# Patient Record
Sex: MALE | Race: WHITE | Employment: FULL TIME | URBAN - METROPOLITAN AREA
[De-identification: names, ages, dates, MRNs, and addresses within clinical notes are randomized per-mention and may not be internally consistent; named-entity substitution may affect disease eponyms.]

---

## 2017-01-26 ENCOUNTER — HOSPITAL ENCOUNTER (OUTPATIENT)
Dept: PATHOLOGY | Age: 62
Discharge: HOME | End: 2017-01-26
Attending: PLASTIC SURGERY
Payer: COMMERCIAL

## 2017-01-26 DIAGNOSIS — R22.0 CHEEK MASS: ICD-10-CM

## 2017-01-26 PROCEDURE — 10021 FNA BX W/O IMG GDN 1ST LES: CPT | Performed by: PLASTIC SURGERY

## 2017-01-26 PROCEDURE — 88173 CYTOPATH EVAL FNA REPORT: CPT | Performed by: PLASTIC SURGERY

## 2017-03-17 ENCOUNTER — HOSPITAL ENCOUNTER (OUTPATIENT)
Dept: PREADMISSION TESTING | Age: 62
Discharge: HOME | End: 2017-03-17
Attending: PLASTIC SURGERY
Payer: COMMERCIAL

## 2017-03-17 LAB
ALBUMIN SERPL BCP-MCNC: 3.8 G/DL (ref 3.5–5)
ALBUMIN/GLOB SERPL: 1.1 {RATIO} (ref 1.1–2.2)
ALP SERPL-CCNC: 78 U/L (ref 45–117)
ALT SERPL-CCNC: 53 U/L (ref 12–78)
ANION GAP BLD CALC-SCNC: 9 MMOL/L (ref 5–15)
AST SERPL W P-5'-P-CCNC: 25 U/L (ref 15–37)
BASOPHILS # BLD AUTO: 0 K/UL (ref 0–0.1)
BASOPHILS # BLD: 1 % (ref 0–1)
BILIRUB SERPL-MCNC: 0.4 MG/DL (ref 0.2–1)
BUN SERPL-MCNC: 19 MG/DL (ref 6–20)
BUN/CREAT SERPL: 22 (ref 12–20)
CALCIUM SERPL-MCNC: 8.6 MG/DL (ref 8.5–10.1)
CHLORIDE SERPL-SCNC: 109 MMOL/L (ref 97–108)
CO2 SERPL-SCNC: 25 MMOL/L (ref 21–32)
CREAT SERPL-MCNC: 0.87 MG/DL (ref 0.7–1.3)
EOSINOPHIL # BLD: 0.2 K/UL (ref 0–0.4)
EOSINOPHIL NFR BLD: 3 % (ref 0–7)
ERYTHROCYTE [DISTWIDTH] IN BLOOD BY AUTOMATED COUNT: 13.6 % (ref 11.5–14.5)
GLOBULIN SER CALC-MCNC: 3.6 G/DL (ref 2–4)
GLUCOSE SERPL-MCNC: 93 MG/DL (ref 65–100)
HCT VFR BLD AUTO: 43.6 % (ref 36.6–50.3)
HGB BLD-MCNC: 14.6 G/DL (ref 12.1–17)
LYMPHOCYTES # BLD AUTO: 25 % (ref 12–49)
LYMPHOCYTES # BLD: 1.8 K/UL (ref 0.8–3.5)
MCH RBC QN AUTO: 29.9 PG (ref 26–34)
MCHC RBC AUTO-ENTMCNC: 33.5 G/DL (ref 30–36.5)
MCV RBC AUTO: 89.2 FL (ref 80–99)
MONOCYTES # BLD: 0.9 K/UL (ref 0–1)
MONOCYTES NFR BLD AUTO: 13 % (ref 5–13)
NEUTS SEG # BLD: 4 K/UL (ref 1.8–8)
NEUTS SEG NFR BLD AUTO: 58 % (ref 32–75)
PLATELET # BLD AUTO: 194 K/UL (ref 150–400)
POTASSIUM SERPL-SCNC: 4.1 MMOL/L (ref 3.5–5.1)
PROT SERPL-MCNC: 7.4 G/DL (ref 6.4–8.2)
RBC # BLD AUTO: 4.89 M/UL (ref 4.1–5.7)
SODIUM SERPL-SCNC: 143 MMOL/L (ref 136–145)
WBC # BLD AUTO: 7 K/UL (ref 4.1–11.1)

## 2017-03-17 PROCEDURE — 80053 COMPREHEN METABOLIC PANEL: CPT | Performed by: PLASTIC SURGERY

## 2017-03-17 PROCEDURE — 85025 COMPLETE CBC W/AUTO DIFF WBC: CPT | Performed by: PLASTIC SURGERY

## 2017-03-17 PROCEDURE — 36415 COLL VENOUS BLD VENIPUNCTURE: CPT | Performed by: PLASTIC SURGERY

## 2017-03-17 RX ORDER — AMLODIPINE BESYLATE 5 MG/1
5 TABLET ORAL DAILY
COMMUNITY

## 2017-03-17 RX ORDER — ATORVASTATIN CALCIUM 40 MG/1
40 TABLET, FILM COATED ORAL DAILY
COMMUNITY

## 2017-03-17 RX ORDER — ASPIRIN 325 MG
325 TABLET ORAL DAILY
COMMUNITY
End: 2019-05-23

## 2017-03-17 RX ORDER — LISINOPRIL 20 MG/1
20 TABLET ORAL DAILY
COMMUNITY

## 2017-03-17 RX ORDER — METOPROLOL SUCCINATE 100 MG/1
TABLET, EXTENDED RELEASE ORAL DAILY
COMMUNITY

## 2017-03-17 NOTE — PERIOP NOTES
[facility]  Preoperative Instructions        Surgery Date 3/24/2017          Time of Arrival 12:00 PM    1. On the day of your surgery, please report to the Surgical Services Registration Desk and sign in at your designated time. The Surgery Center is located to the right of the Emergency Room. 2. You must have someone with you to drive you home. You should not drive a car for 24 hours following surgery. Please make arrangements for a friend or family member to stay with you for the first 24 hours after your surgery. 3. Do not have anything to eat or drink (including water, gum, mints, coffee, juice) after midnight 3/23/2017              . This may not apply to medications prescribed by your physician. Please note special instructions, if applicable. If you are currently taking Plavix, Coumadin, or other blood-thinning agents, contact your surgeon for instructions. 4. We recommend you do not drink any alcoholic beverages for 24 hours before and after your surgery. 5. Have a list of all current medications, including vitamins, herbal supplements and any other over the counter medications. Stop all Aspirin and non-steroidal anti-inflammatory drugs (I.e. Advil, Aleve), as directed by your surgeon's office. Stop all vitamins and herbal supplements seven days prior to your surgery. 6. Wear comfortable clothes. Wear glasses instead of contacts. Do not bring any money or jewelry. Please bring picture ID, insurance card, and any prearranged co-payment or hospital payment. Do not wear make-up, particularly mascara the morning of your surgery. Do not wear nail polish, particularly if you are having foot /hand surgery. Wear your hair loose or down, no ponytails, buns, rivera pins or clips. All body piercings must be removed.   Please shower with antibacterial soap for three consecutive days before and on the morning of surgery, but do not apply any lotions, powders or deodorants after the shower on the day of surgery. Please use a fresh towels after each shower. Please sleep in clean clothes and change bed linens the night before surgery. Please do not shave for 48 hours prior to surgery. Shaving of the face is acceptable. 7. You should understand that if you do not follow these instructions your surgery may be cancelled. If your physical condition changes (I.e. fever, cold or flu) please contact your surgeon as soon as possible. 8. It is important that you be on time. If a situation occurs where you may be late, please call [phone] (OR Holding Area). 9. If you have any questions and or problems, please call [phone] (Pre-admission Testing). 10. Your surgery time may be subject to change. You will receive a phone call the evening prior if your time changes. 11.  If having outpatient surgery, you must have someone to drive you here, stay with you during the duration of your stay, and to drive you home at time of discharge. Special Instructions:Stop Aspirin according to your Surgeon/Cardiologist Instruction. Call PAT Department back today to give medication list [phone]. MEDICATIONS TO TAKE THE MORNING OF SURGERY WITH A SIP OF WATER: Metoprolol, Amlodipine      I understand a pre-operative phone call will be made to verify my surgery time. In the event that I am not available, I give permission for a message to be left on my answering service and/or with another person?  Yes [phone]         ___________________      __________   _________    (Signature of Patient)             (Witness)                (Date and Time)

## 2017-03-17 NOTE — PERIOP NOTES
Pt did not bring medications or medication list with him to his PAT visit today. He did not know the names of his medications or dosages, I requested he call back today and please provide the names and dosages of his current medications. I also instructed him to call his Surgeon/Cardiologist regarding his Aspirin therapy and when or if it should be stopped in preparation for surgery. Pt stated he will call me back today.  Radha Nolen RN

## 2017-03-17 NOTE — PERIOP NOTES
Pt called in with current medications, added to PTA list. Pt instructed to also take his Amlodipine the morning of surgery with a small sip of water along with his Metoprolol. Pt able to verbalize instructions accurately. Addendum made to pt take home Instruction Sheet.  Lysle Sandhoff RN

## 2017-03-24 ENCOUNTER — HOSPITAL ENCOUNTER (OUTPATIENT)
Age: 62
Setting detail: OUTPATIENT SURGERY
Discharge: HOME | End: 2017-03-24
Attending: PLASTIC SURGERY | Admitting: PLASTIC SURGERY
Payer: COMMERCIAL

## 2017-03-24 ENCOUNTER — ANESTHESIA (OUTPATIENT)
Dept: SURGERY | Age: 62
End: 2017-03-24
Payer: COMMERCIAL

## 2017-03-24 ENCOUNTER — ANESTHESIA EVENT (OUTPATIENT)
Dept: SURGERY | Age: 62
End: 2017-03-24
Payer: COMMERCIAL

## 2017-03-24 VITALS
OXYGEN SATURATION: 95 % | DIASTOLIC BLOOD PRESSURE: 60 MMHG | SYSTOLIC BLOOD PRESSURE: 152 MMHG | HEIGHT: 70 IN | HEART RATE: 66 BPM | BODY MASS INDEX: 36.61 KG/M2 | TEMPERATURE: 98.5 F | RESPIRATION RATE: 16 BRPM | WEIGHT: 255.73 LBS

## 2017-03-24 PROCEDURE — 76010000138 HC OR TIME 0.5 TO 1 HR: Performed by: PLASTIC SURGERY

## 2017-03-24 PROCEDURE — 77030018836 HC SOL IRR NACL ICUM -A: Performed by: PLASTIC SURGERY

## 2017-03-24 PROCEDURE — 74011250636 HC RX REV CODE- 250/636: Performed by: PLASTIC SURGERY

## 2017-03-24 PROCEDURE — 74011250636 HC RX REV CODE- 250/636

## 2017-03-24 PROCEDURE — 77030002933 HC SUT MCRYL J&J -A: Performed by: PLASTIC SURGERY

## 2017-03-24 PROCEDURE — 77030020782 HC GWN BAIR PAWS FLX 3M -B

## 2017-03-24 PROCEDURE — 77030032490 HC SLV COMPR SCD KNE COVD -B: Performed by: PLASTIC SURGERY

## 2017-03-24 PROCEDURE — 88304 TISSUE EXAM BY PATHOLOGIST: CPT | Performed by: PLASTIC SURGERY

## 2017-03-24 PROCEDURE — 76210000020 HC REC RM PH II FIRST 0.5 HR: Performed by: PLASTIC SURGERY

## 2017-03-24 PROCEDURE — 74011250636 HC RX REV CODE- 250/636: Performed by: ANESTHESIOLOGY

## 2017-03-24 PROCEDURE — 77030011640 HC PAD GRND REM COVD -A: Performed by: PLASTIC SURGERY

## 2017-03-24 PROCEDURE — 76210000006 HC OR PH I REC 0.5 TO 1 HR: Performed by: PLASTIC SURGERY

## 2017-03-24 PROCEDURE — 77030002986 HC SUT PROL J&J -A: Performed by: PLASTIC SURGERY

## 2017-03-24 PROCEDURE — 74011000250 HC RX REV CODE- 250: Performed by: PLASTIC SURGERY

## 2017-03-24 PROCEDURE — 76060000032 HC ANESTHESIA 0.5 TO 1 HR: Performed by: PLASTIC SURGERY

## 2017-03-24 RX ORDER — SODIUM CHLORIDE, SODIUM LACTATE, POTASSIUM CHLORIDE, CALCIUM CHLORIDE 600; 310; 30; 20 MG/100ML; MG/100ML; MG/100ML; MG/100ML
25 INJECTION, SOLUTION INTRAVENOUS CONTINUOUS
Status: DISCONTINUED | OUTPATIENT
Start: 2017-03-24 | End: 2017-03-24 | Stop reason: HOSPADM

## 2017-03-24 RX ORDER — CEFAZOLIN SODIUM IN 0.9 % NACL 2 G/100 ML
2 PLASTIC BAG, INJECTION (ML) INTRAVENOUS EVERY 8 HOURS
Status: COMPLETED | OUTPATIENT
Start: 2017-03-24 | End: 2017-03-24

## 2017-03-24 RX ORDER — ACETAMINOPHEN AND CODEINE PHOSPHATE 300; 30 MG/1; MG/1
1-2 TABLET ORAL
Qty: 20 TAB | Refills: 0 | Status: SHIPPED | OUTPATIENT
Start: 2017-03-24

## 2017-03-24 RX ORDER — LIDOCAINE HYDROCHLORIDE AND EPINEPHRINE 10; 10 MG/ML; UG/ML
INJECTION, SOLUTION INFILTRATION; PERINEURAL AS NEEDED
Status: DISCONTINUED | OUTPATIENT
Start: 2017-03-24 | End: 2017-03-24 | Stop reason: HOSPADM

## 2017-03-24 RX ORDER — MIDAZOLAM HYDROCHLORIDE 1 MG/ML
INJECTION, SOLUTION INTRAMUSCULAR; INTRAVENOUS AS NEEDED
Status: DISCONTINUED | OUTPATIENT
Start: 2017-03-24 | End: 2017-03-24 | Stop reason: HOSPADM

## 2017-03-24 RX ORDER — LANOLIN ALCOHOL/MO/W.PET/CERES
1000 CREAM (GRAM) TOPICAL DAILY
COMMUNITY

## 2017-03-24 RX ORDER — HYDROMORPHONE HYDROCHLORIDE 1 MG/ML
.2-.5 INJECTION, SOLUTION INTRAMUSCULAR; INTRAVENOUS; SUBCUTANEOUS
Status: DISCONTINUED | OUTPATIENT
Start: 2017-03-24 | End: 2017-03-24 | Stop reason: HOSPADM

## 2017-03-24 RX ORDER — LIDOCAINE HYDROCHLORIDE 10 MG/ML
0.1 INJECTION, SOLUTION EPIDURAL; INFILTRATION; INTRACAUDAL; PERINEURAL AS NEEDED
Status: DISCONTINUED | OUTPATIENT
Start: 2017-03-24 | End: 2017-03-24 | Stop reason: HOSPADM

## 2017-03-24 RX ORDER — FENTANYL CITRATE 50 UG/ML
INJECTION, SOLUTION INTRAMUSCULAR; INTRAVENOUS AS NEEDED
Status: DISCONTINUED | OUTPATIENT
Start: 2017-03-24 | End: 2017-03-24 | Stop reason: HOSPADM

## 2017-03-24 RX ORDER — LYSINE HCL 500 MG
TABLET ORAL
COMMUNITY

## 2017-03-24 RX ORDER — FENTANYL CITRATE 50 UG/ML
25 INJECTION, SOLUTION INTRAMUSCULAR; INTRAVENOUS
Status: DISCONTINUED | OUTPATIENT
Start: 2017-03-24 | End: 2017-03-24 | Stop reason: HOSPADM

## 2017-03-24 RX ORDER — MIDAZOLAM HYDROCHLORIDE 1 MG/ML
1 INJECTION, SOLUTION INTRAMUSCULAR; INTRAVENOUS AS NEEDED
Status: DISCONTINUED | OUTPATIENT
Start: 2017-03-24 | End: 2017-03-24 | Stop reason: HOSPADM

## 2017-03-24 RX ORDER — FENTANYL CITRATE 50 UG/ML
50 INJECTION, SOLUTION INTRAMUSCULAR; INTRAVENOUS AS NEEDED
Status: DISCONTINUED | OUTPATIENT
Start: 2017-03-24 | End: 2017-03-24 | Stop reason: HOSPADM

## 2017-03-24 RX ORDER — PROPOFOL 10 MG/ML
INJECTION, EMULSION INTRAVENOUS AS NEEDED
Status: DISCONTINUED | OUTPATIENT
Start: 2017-03-24 | End: 2017-03-24 | Stop reason: HOSPADM

## 2017-03-24 RX ORDER — DIPHENHYDRAMINE HYDROCHLORIDE 50 MG/ML
12.5 INJECTION, SOLUTION INTRAMUSCULAR; INTRAVENOUS AS NEEDED
Status: DISCONTINUED | OUTPATIENT
Start: 2017-03-24 | End: 2017-03-24 | Stop reason: HOSPADM

## 2017-03-24 RX ORDER — ONDANSETRON 2 MG/ML
4 INJECTION INTRAMUSCULAR; INTRAVENOUS AS NEEDED
Status: DISCONTINUED | OUTPATIENT
Start: 2017-03-24 | End: 2017-03-24 | Stop reason: HOSPADM

## 2017-03-24 RX ORDER — FISH OIL/DHA/EPA 1200-144MG
CAPSULE ORAL
COMMUNITY

## 2017-03-24 RX ORDER — PROPOFOL 10 MG/ML
INJECTION, EMULSION INTRAVENOUS
Status: DISCONTINUED | OUTPATIENT
Start: 2017-03-24 | End: 2017-03-24 | Stop reason: HOSPADM

## 2017-03-24 RX ADMIN — MIDAZOLAM HYDROCHLORIDE 1 MG: 1 INJECTION, SOLUTION INTRAMUSCULAR; INTRAVENOUS at 12:00

## 2017-03-24 RX ADMIN — PROPOFOL 30 MG: 10 INJECTION, EMULSION INTRAVENOUS at 12:08

## 2017-03-24 RX ADMIN — PROPOFOL 30 MG: 10 INJECTION, EMULSION INTRAVENOUS at 12:06

## 2017-03-24 RX ADMIN — FENTANYL CITRATE 50 MCG: 50 INJECTION, SOLUTION INTRAMUSCULAR; INTRAVENOUS at 12:05

## 2017-03-24 RX ADMIN — CEFAZOLIN 2 G: 10 INJECTION, POWDER, FOR SOLUTION INTRAVENOUS; PARENTERAL at 12:04

## 2017-03-24 RX ADMIN — MIDAZOLAM HYDROCHLORIDE 1 MG: 1 INJECTION, SOLUTION INTRAMUSCULAR; INTRAVENOUS at 12:03

## 2017-03-24 RX ADMIN — SODIUM CHLORIDE, SODIUM LACTATE, POTASSIUM CHLORIDE, AND CALCIUM CHLORIDE 25 ML/HR: 600; 310; 30; 20 INJECTION, SOLUTION INTRAVENOUS at 11:18

## 2017-03-24 RX ADMIN — PROPOFOL 65 MCG/KG/MIN: 10 INJECTION, EMULSION INTRAVENOUS at 12:07

## 2017-03-24 NOTE — ANESTHESIA PREPROCEDURE EVALUATION
Anesthetic History   No history of anesthetic complications            Review of Systems / Medical History  Patient summary reviewed, nursing notes reviewed and pertinent labs reviewed    Pulmonary        Sleep apnea: No treatment           Neuro/Psych   Within defined limits           Cardiovascular    Hypertension          CAD, PAD and CABG    Exercise tolerance: >4 METS     GI/Hepatic/Renal       Hepatitis (s/p treatment): type C    PUD     Endo/Other        Morbid obesity and arthritis     Other Findings              Physical Exam    Airway  Mallampati: III  TM Distance: 4 - 6 cm  Neck ROM: normal range of motion, short neck   Mouth opening: Normal     Cardiovascular    Rhythm: regular  Rate: normal         Dental    Dentition: Lower dentition intact and Upper dentition intact     Pulmonary  Breath sounds clear to auscultation               Abdominal  GI exam deferred       Other Findings            Anesthetic Plan    ASA: 3  Anesthesia type: MAC          Induction: Intravenous  Anesthetic plan and risks discussed with: Patient

## 2017-03-24 NOTE — ANESTHESIA POSTPROCEDURE EVALUATION
Post-Anesthesia Evaluation and Assessment    Patient: Mallorie Query MRN: [id number]  SSN: [id number]    YOB: 1955  Age: 57 y.o. Sex: male       Cardiovascular Function/Vital Signs  Visit Vitals    /74 (BP 1 Location: Right arm, BP Patient Position: At rest)    Pulse 64    Temp 36.7 °C (98 °F)    Resp 16    Ht 5' 10\" (1.778 m)    Wt 116 kg (255 lb 11.7 oz)    SpO2 95%    BMI 36.69 kg/m2       Patient is status post MAC anesthesia for Procedure(s):  EXCISION LEFT CHEEK MASS. Nausea/Vomiting: None    Postoperative hydration reviewed and adequate. Pain:  Pain Scale 1: Numeric (0 - 10) (03/24/17 1330)  Pain Intensity 1: 0 (03/24/17 1330)   Managed    Neurological Status:   Neuro (WDL): Exceptions to WDL (03/24/17 1330)  Neuro  Neurologic State: Alert; Appropriate for age (03/24/17 1330)  Orientation Level: Oriented to person;Oriented to place;Oriented to situation (03/24/17 1330)  Cognition: Follows commands (03/24/17 1330)  LUE Motor Response: Purposeful (03/24/17 1330)  LLE Motor Response: Purposeful (03/24/17 1330)  RUE Motor Response: Purposeful (03/24/17 1330)  RLE Motor Response: Purposeful (03/24/17 1330)   At baseline    Mental Status and Level of Consciousness: Arousable    Pulmonary Status:   O2 Device: Room air (03/24/17 1330)   Adequate oxygenation and airway patent    Complications related to anesthesia: None    Post-anesthesia assessment completed.  No concerns    Signed By: Felix Gibson MD     March 24, 2017

## 2017-03-24 NOTE — IP AVS SNAPSHOT
[address] [facility] 83. [phone] Patient: Nighat Orourke MRN: [id number] :1955 You are allergic to the following Allergen Reactions Vicodin (Hydrocodone-Acetaminophen) Other (comments) \"Sweating\" Recent Documentation Height Weight BMI Smoking Status 1.778 m 116 kg 36.69 kg/m2 Former Smoker About your hospitalization You were admitted on:  2017 You last received care in the:  [facility] PACU You were discharged on:  2017 Unit phone number:  [phone] Why you were hospitalized Your primary diagnosis was:  Not on File Providers Seen During Your Hospitalizations Provider Role Specialty Primary office phone Linda Shelton MD Attending Provider Plastic Surgery [phone] Your Primary Care Physician (PCP) Primary Care Physician Office Phone Office Fax Aman Christian [phone] Follow-up Information Follow up With Details Comments Contact Info Sherita Basilio MD   [address] [place] [address] 83. [phone] Current Discharge Medication List  
  
START taking these medications Dose & Instructions Dispensing Information Comments Morning Noon Evening Bedtime  
 acetaminophen-codeine 300-30 mg per tablet Commonly known as:  TYLENOL-CODEINE #3 Your last dose was: Your next dose is:    
   
   
 Dose:  1-2 Tab Take 1-2 Tabs by mouth every four (4) hours as needed for Pain. Max Daily Amount: 12 Tabs. Quantity:  20 Tab Refills:  0 CONTINUE these medications which have NOT CHANGED Dose & Instructions Dispensing Information Comments Morning Noon Evening Bedtime  
 amLODIPine 5 mg tablet Commonly known as:  Rudene Post Your last dose was: Your next dose is: Dose:  5 mg Take 5 mg by mouth daily. Refills:  0  
     
   
   
   
  
 aspirin 325 mg tablet Commonly known as:  ASPIRIN Your last dose was: Your next dose is:    
   
   
 Dose:  325 mg Take 325 mg by mouth daily. Refills:  0  
     
   
   
   
  
 atorvastatin 40 mg tablet Commonly known as:  LIPITOR Your last dose was: Your next dose is:    
   
   
 Dose:  40 mg Take 40 mg by mouth daily. Refills:  0 Calcium Carbonate-Vit D3-Min 600 mg calcium- 400 unit Tab Your last dose was: Your next dose is: Take  by mouth. Refills:  0  
     
   
   
   
  
 cyanocobalamin 1,000 mcg tablet Your last dose was: Your next dose is:    
   
   
 Dose:  1000 mcg Take 1,000 mcg by mouth daily. Refills:  0  
     
   
   
   
  
 fish oil-dha-epa 1,200-144-216 mg Cap Your last dose was: Your next dose is: Take  by mouth. Refills:  0  
     
   
   
   
  
 lisinopril 20 mg tablet Commonly known as:  Alix Loss Your last dose was: Your next dose is:    
   
   
 Dose:  20 mg Take 20 mg by mouth daily. Refills:  0  
     
   
   
   
  
 metoprolol succinate 100 mg tablet Commonly known as:  TOPROL-XL Your last dose was: Your next dose is: Take  by mouth daily. Refills:  0  
     
   
   
   
  
 zinc 50 mg Tab tablet Your last dose was: Your next dose is: Take  by mouth daily. Refills:  0 Where to Get Your Medications Information on where to get these meds will be given to you by the nurse or doctor. ! Ask your nurse or doctor about these medications  
  acetaminophen-codeine 300-30 mg per tablet Discharge Instructions May remove dressing tomorrow and shower.  Keep incision clean with soap and water. No heavy lifting or vigorous activity for a few days. FOLLOW UP VISIT Appointment in: One Week Call [phone] to make appt. DISCHARGE SUMMARY from Nurse The following personal items are in your possession at time of discharge: 
 
Dental Appliances: None Visual Aid: None Home Medications: None Jewelry: None Clothing: Socks, Pants, Undergarments, Shirt, Footwear Other Valuables: None Personal Items Sent to Safe: declined PATIENT INSTRUCTIONS: 
 
After general anesthesia or intravenous sedation, for 24 hours or while taking prescription Narcotics: · Limit your activities · Do not drive and operate hazardous machinery · Do not make important personal or business decisions · Do  not drink alcoholic beverages · If you have not urinated within 8 hours after discharge, please contact your surgeon on call. Report the following to your surgeon: 
· Excessive pain, swelling, redness or odor of or around the surgical area · Temperature over 100.5 · Nausea and vomiting lasting longer than 4 hours or if unable to take medications · Any signs of decreased circulation or nerve impairment to extremity: change in color, persistent  numbness, tingling, coldness or increase pain · Any questions What to do at Home: *  Please give a list of your current medications to your Primary Care Provider. *  Please update this list whenever your medications are discontinued, doses are 
    changed, or new medications (including over-the-counter products) are added. *  Please carry medication information at all times in case of emergency situations. These are general instructions for a healthy lifestyle: No smoking/ No tobacco products/ Avoid exposure to second hand smoke Surgeon General's Warning:  Quitting smoking now greatly reduces serious risk to your health. Obesity, smoking, and sedentary lifestyle greatly increases your risk for illness A healthy diet, regular physical exercise & weight monitoring are important for maintaining a healthy lifestyle You may be retaining fluid if you have a history of heart failure or if you experience any of the following symptoms:  Weight gain of 3 pounds or more overnight or 5 pounds in a week, increased swelling in our hands or feet or shortness of breath while lying flat in bed. Please call your doctor as soon as you notice any of these symptoms; do not wait until your next office visit. Recognize signs and symptoms of STROKE: 
 
F-face looks uneven A-arms unable to move or move unevenly S-speech slurred or non-existent T-time-call 911 as soon as signs and symptoms begin-DO NOT go Back to bed or wait to see if you get better-TIME IS BRAIN. Warning Signs of HEART ATTACK Call 911 if you have these symptoms: 
? Chest discomfort. Most heart attacks involve discomfort in the center of the chest that lasts more than a few minutes, or that goes away and comes back. It can feel like uncomfortable pressure, squeezing, fullness, or pain. ? Discomfort in other areas of the upper body. Symptoms can include pain or discomfort in one or both arms, the back, neck, jaw, or stomach. ? Shortness of breath with or without chest discomfort. ? Other signs may include breaking out in a cold sweat, nausea, or lightheadedness. Don't wait more than five minutes to call [address]! Fast action can save your life. Calling 911 is almost always the fastest way to get lifesaving treatment. Emergency Medical Services staff can begin treatment when they arrive  up to an hour sooner than if someone gets to the hospital by car. The discharge information has been reviewed with the patient and caregiver. The patient and caregiver verbalized understanding.  
 
Discharge medications reviewed with the {[employer] meds reviewed [id number]} and appropriate educational materials and side effects teaching were provided. A common side effect of anesthesia following surgery is nausea and/or vomiting. In order to decrease symptoms, it is wise to avoid foods that are high in fat, greasy foods, milk products, and spicy foods for the first 24 hours. Acceptable foods for the first 24 hours following surgery include but are not limited to: 
 
? soup 
? broth 
?  toast  
? crackers ? applesauce 
? bananas  
? mashed potatoes, 
? soft or scrambled eggs 
? oatmeal 
?  jello It is important to eat when taking your pain medication. This will help to prevent nausea. If possible, please try to time your meals with your medications. It is very important to stay hydrated following surgery. Sip fluids frequently while awake. Avoid acidic drinks such as citrus juices and soda for 24 hours. Carbonated beverages may cause bloating and gas. Acceptable fluids include: 
 
? water (flavor packets may add variety) ? coffee or tea (in moderation) ? Gatorade ? Lizarraga Diones ? apple juice 
? cranberry juice You are encouraged to cough and deep breathe every hour when awake. This will help to prevent respiratory complications following anesthesia. You may want to hug a pillow when coughing and sneezing to add additional support to the surgical area and to decrease discomfort if you had abdominal or chest surgery. If you are discharged home with support stockings, you may remove them after 24 hours. Support stockings are used to help prevent blood clots in the legs following surgery. Please take time to review all of your Home Care Instructions and Medication Information sheets provided in your discharge packet. If you have any questions, please contact your surgeons office. Thank you. Acetaminophen/Codeine (By mouth) Acetaminophen (w-ewom-r-MIN-oh-fen), Codeine Phosphate (ROSSE-izzy FOS-fate) Treats mild to moderately severe pain. This medicine contains a narcotic pain reliever. Brand Name(s):Capital w/Codeine, Tylenol With Codeine No. 4, Tylenol w/Codeine #3, Tylenol w/Codeine #4, Tylenol with Codeine No. 3 There may be other brand names for this medicine. When This Medicine Should Not Be Used: This medicine is not right for everyone. Do not use it if you had an allergic reaction to acetaminophen or codeine, or to other narcotic medicines. How to Use This Medicine:  
Capsule, Liquid, Tablet · Your doctor will tell you how much medicine to use. Do not use more than directed. · You may take this medicine with food or milk if it upsets your stomach. · Measure the oral liquid medicine with a marked measuring spoon, oral syringe, or medicine cup. · Drink plenty of liquids to help avoid constipation. · Missed dose: Take a dose as soon as you remember. If it is almost time for your next dose, wait until then and take a regular dose. Do not take extra medicine to make up for a missed dose. · Store the medicine in a closed container at room temperature, away from heat, moisture, and direct light. Keep the oral liquid in the refrigerator. Do not freeze. Drugs and Foods to Avoid: Ask your doctor or pharmacist before using any other medicine, including over-the-counter medicines, vitamins, and herbal products. · Some medicines and foods can affect how this medicine works. Tell your doctor if you are taking any of the following: ¨ Depression medicine, such as citalopram, fluoxetine, sertraline ¨ Phenothiazine medicine, such as prochlorperazine ¨ Tranquilizer medicine, such as chlordiazepoxide · Do not drink alcohol while you are using this medicine. Acetaminophen can damage your liver, and alcohol can increase this risk. Do not take acetaminophen without asking your doctor if you have 3 or more drinks of alcohol every day. Warnings While Using This Medicine: · Tell your doctor if you are pregnant or breastfeeding, or if you have kidney disease, liver disease, adrenal problems (such as [place] disease), asthma, or breathing problems (such as respiratory depression, sleep apnea). Tell your doctor if you have an enlarged prostate, trouble urinating, stomach problems, an underactive thyroid, or a history of head injury or brain damage. Tell your doctor if you had an allergic reaction to sulfites or if you have a history of drug or alcohol abuse. · This medicine can be habit-forming. Do not use more than your prescribed dose. Call your doctor if you think your medicine is not working. · This medicine contains acetaminophen. Read the labels of all other medicines you are using to see if they also contain acetaminophen, or ask your doctor or pharmacist. Juan José Amel not use more than 4 grams (4,000 milligrams) total of acetaminophen in one day. · If you take this medicine for more than a few weeks, do not stop taking it suddenly. Your doctor will need to slowly decrease your dose before you stop it completely. · Get emergency help immediately if you think you may have taken too much of this medicine. Signs of an overdose include shallow breathing, fainting, confusion, nausea, vomiting, pinpoint pupils of the eyes, or pale or blue lips, fingernails, or skin. · This medicine may make you dizzy or drowsy. Do not drive or do anything that could be dangerous until you know how this medicine affects you. · Tell any doctor or dentist who treats you that you are using this medicine. This medicine may affect certain medical test results. · This medicine may cause constipation, especially with long-term use. Ask your doctor if you should use a laxative to prevent and treat constipation. · Keep all medicine out of the reach of children. Never share your medicine with anyone. Possible Side Effects While Using This Medicine:  
Call your doctor right away if you notice any of these side effects: · Allergic reaction: Itching or hives, swelling in your face or hands, swelling or tingling in your mouth or throat, chest tightness, trouble breathing · Dark urine or pale stools, nausea, vomiting, loss of appetite, stomach pain, yellow skin or eyes · Extreme drowsiness or confusion · Lightheadedness, dizziness, or fainting · Seizures · Trouble breathing, shallow breathing, blue lips or nails · Unusual bleeding, bruising, or weakness If you notice these less serious side effects, talk with your doctor: · Mild dizziness or drowsiness · Mild nausea or vomiting, constipation · Rash or itching skin If you notice other side effects that you think are caused by this medicine, tell your doctor. Call your doctor for medical advice about side effects. You may report side effects to FDA at 6-791-FDA-1021 © 2016 [address] is for End User's use only and may not be sold, redistributed or otherwise used for commercial purposes. The above information is an  only. It is not intended as medical advice for individual conditions or treatments. Talk to your doctor, nurse or pharmacist before following any medical regimen to see if it is safe and effective for you. Discharge Orders None Introducing [facility]! Avinash Horowitz introduces [employer] patient portal. Now you can access parts of your medical record, email your doctor's office, and request medication refills online. 1. In your internet browser, go to [email or web address] 2. Click on the First Time User? Click Here link in the Sign In box. You will see the New Member Sign Up page. 3. Enter your [employer] Access Code exactly as it appears below. You will not need to use this code after youve completed the sign-up process. If you do not sign up before the expiration date, you must request a new code. · [employer] Access Code: [email or web address] Expires: 4/25/2017  5:33 PM 
 
4. Enter the last four digits of your Social Security Number (xxxx) and Date of Birth (mm/dd/yyyy) as indicated and click Submit. You will be taken to the next sign-up page. 5. Create a [employer] ID. This will be your [employer] login ID and cannot be changed, so think of one that is secure and easy to remember. 6. Create a [employer] password. You can change your password at any time. 7. Enter your Password Reset Question and Answer. This can be used at a later time if you forget your password. 8. Enter your e-mail address. You will receive e-mail notification when new information is available in [address] 9. Click Sign Up. You can now view and download portions of your medical record. 10. Click the Download Summary menu link to download a portable copy of your medical information. If you have questions, please visit the Frequently Asked Questions section of the [employer] website. Remember, [employer] is NOT to be used for urgent needs. For medical emergencies, dial 911. Now available from your iPhone and Android! General Information Please provide this summary of care documentation to your next provider. Patient Signature:  ____________________________________________________________ Date:  ____________________________________________________________  
  
Shannan Rowe Provider Signature:  ____________________________________________________________ Date:  ____________________________________________________________

## 2017-03-24 NOTE — IP AVS SNAPSHOT
Current Discharge Medication List  
  
START taking these medications Dose & Instructions Dispensing Information Comments Morning Noon Evening Bedtime  
 acetaminophen-codeine 300-30 mg per tablet Commonly known as:  TYLENOL-CODEINE #3 Your last dose was: Your next dose is:    
   
   
 Dose:  1-2 Tab Take 1-2 Tabs by mouth every four (4) hours as needed for Pain. Max Daily Amount: 12 Tabs. Quantity:  20 Tab Refills:  0 CONTINUE these medications which have NOT CHANGED Dose & Instructions Dispensing Information Comments Morning Noon Evening Bedtime  
 amLODIPine 5 mg tablet Commonly known as:  Jerrell Graves Your last dose was: Your next dose is:    
   
   
 Dose:  5 mg Take 5 mg by mouth daily. Refills:  0  
     
   
   
   
  
 aspirin 325 mg tablet Commonly known as:  ASPIRIN Your last dose was: Your next dose is:    
   
   
 Dose:  325 mg Take 325 mg by mouth daily. Refills:  0  
     
   
   
   
  
 atorvastatin 40 mg tablet Commonly known as:  LIPITOR Your last dose was: Your next dose is:    
   
   
 Dose:  40 mg Take 40 mg by mouth daily. Refills:  0 Calcium Carbonate-Vit D3-Min 600 mg calcium- 400 unit Tab Your last dose was: Your next dose is: Take  by mouth. Refills:  0  
     
   
   
   
  
 cyanocobalamin 1,000 mcg tablet Your last dose was: Your next dose is:    
   
   
 Dose:  1000 mcg Take 1,000 mcg by mouth daily. Refills:  0  
     
   
   
   
  
 fish oil-dha-epa 1,200-144-216 mg Cap Your last dose was: Your next dose is: Take  by mouth. Refills:  0  
     
   
   
   
  
 lisinopril 20 mg tablet Commonly known as:  Mallorie Sanchez Your last dose was: Your next dose is:    
   
   
 Dose:  20 mg Take 20 mg by mouth daily. Refills:  0  
     
   
   
   
  
 metoprolol succinate 100 mg tablet Commonly known as:  TOPROL-XL Your last dose was: Your next dose is: Take  by mouth daily. Refills:  0  
     
   
   
   
  
 zinc 50 mg Tab tablet Your last dose was: Your next dose is: Take  by mouth daily. Refills:  0 Where to Get Your Medications Information on where to get these meds will be given to you by the nurse or doctor. ! Ask your nurse or doctor about these medications  
  acetaminophen-codeine 300-30 mg per tablet

## 2017-03-24 NOTE — DISCHARGE INSTRUCTIONS
May remove dressing tomorrow and shower. Keep incision clean with soap and water. No heavy lifting or vigorous activity for a few days. FOLLOW UP VISIT Appointment in: One Week Call [phone] to make appt. DISCHARGE SUMMARY from Nurse    The following personal items are in your possession at time of discharge:    Dental Appliances: None  Visual Aid: None     Home Medications: None  Jewelry: None  Clothing: Socks, Pants, Undergarments, Shirt, Footwear  Other Valuables: None  Personal Items Sent to Safe: declined          PATIENT INSTRUCTIONS:    After general anesthesia or intravenous sedation, for 24 hours or while taking prescription Narcotics:  · Limit your activities  · Do not drive and operate hazardous machinery  · Do not make important personal or business decisions  · Do  not drink alcoholic beverages  · If you have not urinated within 8 hours after discharge, please contact your surgeon on call. Report the following to your surgeon:  · Excessive pain, swelling, redness or odor of or around the surgical area  · Temperature over 100.5  · Nausea and vomiting lasting longer than 4 hours or if unable to take medications  · Any signs of decreased circulation or nerve impairment to extremity: change in color, persistent  numbness, tingling, coldness or increase pain  · Any questions        What to do at Home:    *  Please give a list of your current medications to your Primary Care Provider. *  Please update this list whenever your medications are discontinued, doses are      changed, or new medications (including over-the-counter products) are added. *  Please carry medication information at all times in case of emergency situations. These are general instructions for a healthy lifestyle:    No smoking/ No tobacco products/ Avoid exposure to second hand smoke    Surgeon General's Warning:  Quitting smoking now greatly reduces serious risk to your health.     Obesity, smoking, and sedentary lifestyle greatly increases your risk for illness    A healthy diet, regular physical exercise & weight monitoring are important for maintaining a healthy lifestyle    You may be retaining fluid if you have a history of heart failure or if you experience any of the following symptoms:  Weight gain of 3 pounds or more overnight or 5 pounds in a week, increased swelling in our hands or feet or shortness of breath while lying flat in bed. Please call your doctor as soon as you notice any of these symptoms; do not wait until your next office visit. Recognize signs and symptoms of STROKE:    F-face looks uneven    A-arms unable to move or move unevenly    S-speech slurred or non-existent    T-time-call 911 as soon as signs and symptoms begin-DO NOT go       Back to bed or wait to see if you get better-TIME IS BRAIN. Warning Signs of HEART ATTACK     Call 911 if you have these symptoms:   Chest discomfort. Most heart attacks involve discomfort in the center of the chest that lasts more than a few minutes, or that goes away and comes back. It can feel like uncomfortable pressure, squeezing, fullness, or pain.  Discomfort in other areas of the upper body. Symptoms can include pain or discomfort in one or both arms, the back, neck, jaw, or stomach.  Shortness of breath with or without chest discomfort.  Other signs may include breaking out in a cold sweat, nausea, or lightheadedness. Don't wait more than five minutes to call 911 - MINUTES MATTER! Fast action can save your life. Calling 911 is almost always the fastest way to get lifesaving treatment. Emergency Medical Services staff can begin treatment when they arrive -- up to an hour sooner than if someone gets to the hospital by car. The discharge information has been reviewed with the patient and caregiver. The patient and caregiver verbalized understanding.     Discharge medications reviewed with the patient and caregiver and appropriate educational materials and side effects teaching were provided. A common side effect of anesthesia following surgery is nausea and/or vomiting. In order to decrease symptoms, it is wise to avoid foods that are high in fat, greasy foods, milk products, and spicy foods for the first 24 hours. Acceptable foods for the first 24 hours following surgery include but are not limited to:     soup   broth    toast    crackers    applesauce    bananas    mashed potatoes,   soft or scrambled eggs   oatmeal    jello    It is important to eat when taking your pain medication. This will help to prevent nausea. If possible, please try to time your meals with your medications. It is very important to stay hydrated following surgery. Sip fluids frequently while awake. Avoid acidic drinks such as citrus juices and soda for 24 hours. Carbonated beverages may cause bloating and gas. Acceptable fluids include:    - water (flavor packets may add variety)  - coffee or tea (in moderation)  - Gatorade  - Americo-aid  - apple juice  - cranberry juice    You are encouraged to cough and deep breathe every hour when awake. This will help to prevent respiratory complications following anesthesia. You may want to hug a pillow when coughing and sneezing to add additional support to the surgical area and to decrease discomfort if you had abdominal or chest surgery. If you are discharged home with support stockings, you may remove them after 24 hours. Support stockings are used to help prevent blood clots in the legs following surgery. Please take time to review all of your Home Care Instructions and Medication Information sheets provided in your discharge packet. If you have any questions, please contact your surgeons office. Thank you. Acetaminophen/Codeine (By mouth)   Acetaminophen (p-yibw-m-MIN-oh-fen), Codeine Phosphate (PRABHJOT-izzy FOS-fate)  Treats mild to moderately severe pain. This medicine contains a narcotic pain reliever. Brand Name(s):Capital w/Codeine, Tylenol With Codeine No. 4, Tylenol w/Codeine #3, Tylenol w/Codeine #4, Tylenol with Codeine No. 3   There may be other brand names for this medicine. When This Medicine Should Not Be Used: This medicine is not right for everyone. Do not use it if you had an allergic reaction to acetaminophen or codeine, or to other narcotic medicines. How to Use This Medicine:   Capsule, Liquid, Tablet  · Your doctor will tell you how much medicine to use. Do not use more than directed. · You may take this medicine with food or milk if it upsets your stomach. · Measure the oral liquid medicine with a marked measuring spoon, oral syringe, or medicine cup. · Drink plenty of liquids to help avoid constipation. · Missed dose: Take a dose as soon as you remember. If it is almost time for your next dose, wait until then and take a regular dose. Do not take extra medicine to make up for a missed dose. · Store the medicine in a closed container at room temperature, away from heat, moisture, and direct light. Keep the oral liquid in the refrigerator. Do not freeze. Drugs and Foods to Avoid:   Ask your doctor or pharmacist before using any other medicine, including over-the-counter medicines, vitamins, and herbal products. · Some medicines and foods can affect how this medicine works. Tell your doctor if you are taking any of the following:   ¨ Depression medicine, such as citalopram, fluoxetine, sertraline  ¨ Phenothiazine medicine, such as prochlorperazine  ¨ Tranquilizer medicine, such as chlordiazepoxide  · Do not drink alcohol while you are using this medicine. Acetaminophen can damage your liver, and alcohol can increase this risk. Do not take acetaminophen without asking your doctor if you have 3 or more drinks of alcohol every day.   Warnings While Using This Medicine:   · Tell your doctor if you are pregnant or breastfeeding, or if you have kidney disease, liver disease, adrenal problems (such as Gold disease), asthma, or breathing problems (such as respiratory depression, sleep apnea). Tell your doctor if you have an enlarged prostate, trouble urinating, stomach problems, an underactive thyroid, or a history of head injury or brain damage. Tell your doctor if you had an allergic reaction to sulfites or if you have a history of drug or alcohol abuse. · This medicine can be habit-forming. Do not use more than your prescribed dose. Call your doctor if you think your medicine is not working. · This medicine contains acetaminophen. Read the labels of all other medicines you are using to see if they also contain acetaminophen, or ask your doctor or pharmacist. Leopoldo Santino not use more than 4 grams (4,000 milligrams) total of acetaminophen in one day. · If you take this medicine for more than a few weeks, do not stop taking it suddenly. Your doctor will need to slowly decrease your dose before you stop it completely. · Get emergency help immediately if you think you may have taken too much of this medicine. Signs of an overdose include shallow breathing, fainting, confusion, nausea, vomiting, pinpoint pupils of the eyes, or pale or blue lips, fingernails, or skin. · This medicine may make you dizzy or drowsy. Do not drive or do anything that could be dangerous until you know how this medicine affects you. · Tell any doctor or dentist who treats you that you are using this medicine. This medicine may affect certain medical test results. · This medicine may cause constipation, especially with long-term use. Ask your doctor if you should use a laxative to prevent and treat constipation. · Keep all medicine out of the reach of children. Never share your medicine with anyone.   Possible Side Effects While Using This Medicine:   Call your doctor right away if you notice any of these side effects:  · Allergic reaction: Itching or hives, swelling in your face or hands, swelling or tingling in your mouth or throat, chest tightness, trouble breathing  · Dark urine or pale stools, nausea, vomiting, loss of appetite, stomach pain, yellow skin or eyes  · Extreme drowsiness or confusion  · Lightheadedness, dizziness, or fainting  · Seizures  · Trouble breathing, shallow breathing, blue lips or nails  · Unusual bleeding, bruising, or weakness  If you notice these less serious side effects, talk with your doctor:   · Mild dizziness or drowsiness  · Mild nausea or vomiting, constipation  · Rash or itching skin  If you notice other side effects that you think are caused by this medicine, tell your doctor. Call your doctor for medical advice about side effects. You may report side effects to FDA at 0-647-FDA-0845  © 2016 [employer] is for End User's use only and may not be sold, redistributed or otherwise used for commercial purposes. The above information is an  only. It is not intended as medical advice for individual conditions or treatments. Talk to your doctor, nurse or pharmacist before following any medical regimen to see if it is safe and effective for you.

## 2017-03-24 NOTE — PERIOP NOTES
TRANSFER - OUT REPORT:    Verbal report given to Dylan Farmer RN (name) on Joanne Route  being transferred to Phase 2 (unit) for routine progression of care       Report consisted of patients Situation, Background, Assessment and   Recommendations(SBAR). Information from the following report(s) SBAR, OR Summary, Intake/Output, MAR, Recent Results and Med Rec Status was reviewed with the receiving nurse. Opportunity for questions and clarification was provided.       Patient transported with:   [employer]

## 2017-03-24 NOTE — PERIOP NOTES
For dc home. Vswnl. Denies pain, nausea. dsg to L cheek d&i. Went over [employer] instructions w/pt and sister including Rx and f/up. Verbalized understanding. dc'd home.

## 2017-03-24 NOTE — PERIOP NOTES
Handoff Report from Operating Room to PACU    Report received from Mairna Huff RN and Devin Ashley CRNA regarding Stevan Bertrand. Surgeon(s):  Tracy Corley MD  And Procedure(s) (LRB):  EXCISION LEFT CHEEK MASS (Left)  confirmed   with allergies and dressings discussed. Anesthesia type, drugs, patient history, complications, estimated blood loss, vital signs, intake and output, and last pain medication, lines and temperature were reviewed.

## 2017-03-27 NOTE — OP NOTES
[address]   OP NOTE       Name:  Dillon Martin   MR#:  [id number]   :  1955   Account #:  [de-identified]    Surgery Date:  2017   Date of Adm:  2017       PREOPERATIVE DIAGNOSIS: Left cheek mass. POSTOPERATIVE DIAGNOSIS: Left cheek mass. PROCEDURES PERFORMED: Excision superficial left cheek mass   measuring 4 cm in diameter. SURGEON: Philippe Joe MD    ANESTHESIA: Local with IV sedation. ESTIMATED BLOOD LOSS: Less than 5 mL. SPECIMENS REMOVED: Left cheek mass. COMPLICATIONS: None. INDICATIONS: This 58-year-old white male presented with a painful,   enlarging left cheek mass. Fine-needle aspiration was nondiagnostic. Surgical excision was indicated. DESCRIPTION OF PROCEDURE: After informed consent was   obtained and under IV sedation, the face was prepped and draped in   the usual fashion. An incision was made within relaxed skin tension   lines directly over the mass, which was carefully dissected free from   adherent tissue. It was a globular, fatty mass that was consistent with   lipoma. It was completely excised and sent for permanent pathologic   evaluation. Hemostasis was obtained and the wound was irrigated. It   was then closed in layers with a 5-0 Vicryl and a 6-0 Prolene. He   tolerated the procedure well and was transferred to the recovery room   in good condition.         Jr Baldwin MD        / KARINA   D:  2017   14:44   T:  2017   20:17   Job #:  [id number]

## 2019-05-22 ENCOUNTER — HOSPITAL ENCOUNTER (OUTPATIENT)
Age: 64
Discharge: HOME | End: 2019-05-23
Attending: INTERNAL MEDICINE | Admitting: INTERNAL MEDICINE
Payer: COMMERCIAL

## 2019-05-22 DIAGNOSIS — I73.9 PVD (PERIPHERAL VASCULAR DISEASE) (HCC): ICD-10-CM

## 2019-05-22 LAB — ACT BLD: 252 SECS (ref 79–138)

## 2019-05-22 PROCEDURE — C1769 GUIDE WIRE: HCPCS | Performed by: INTERNAL MEDICINE

## 2019-05-22 PROCEDURE — 74011250636 HC RX REV CODE- 250/636: Performed by: INTERNAL MEDICINE

## 2019-05-22 PROCEDURE — C1887 CATHETER, GUIDING: HCPCS | Performed by: INTERNAL MEDICINE

## 2019-05-22 PROCEDURE — 37226 HC PLC STNT FEMPOP UNI +/-PTA: CPT | Performed by: INTERNAL MEDICINE

## 2019-05-22 PROCEDURE — 74011250637 HC RX REV CODE- 250/637: Performed by: INTERNAL MEDICINE

## 2019-05-22 PROCEDURE — 76937 US GUIDE VASCULAR ACCESS: CPT | Performed by: INTERNAL MEDICINE

## 2019-05-22 PROCEDURE — C1894 INTRO/SHEATH, NON-LASER: HCPCS | Performed by: INTERNAL MEDICINE

## 2019-05-22 PROCEDURE — C1874 STENT, COATED/COV W/DEL SYS: HCPCS | Performed by: INTERNAL MEDICINE

## 2019-05-22 PROCEDURE — 77030010852 HC CATH NB ADVNTG COOK -B: Performed by: INTERNAL MEDICINE

## 2019-05-22 PROCEDURE — C1760 CLOSURE DEV, VASC: HCPCS | Performed by: INTERNAL MEDICINE

## 2019-05-22 PROCEDURE — 99153 MOD SED SAME PHYS/QHP EA: CPT | Performed by: INTERNAL MEDICINE

## 2019-05-22 PROCEDURE — 85347 COAGULATION TIME ACTIVATED: CPT

## 2019-05-22 PROCEDURE — 77030029065 HC DRSG HEMO QCLOT ZMED -B: Performed by: INTERNAL MEDICINE

## 2019-05-22 PROCEDURE — 99152 MOD SED SAME PHYS/QHP 5/>YRS: CPT | Performed by: INTERNAL MEDICINE

## 2019-05-22 PROCEDURE — 74011250636 HC RX REV CODE- 250/636

## 2019-05-22 PROCEDURE — 74011636320 HC RX REV CODE- 636/320: Performed by: INTERNAL MEDICINE

## 2019-05-22 PROCEDURE — 75716 ARTERY X-RAYS ARMS/LEGS: CPT | Performed by: INTERNAL MEDICINE

## 2019-05-22 PROCEDURE — 77030019697 HC SYR ANGI INFL MRTM -B: Performed by: INTERNAL MEDICINE

## 2019-05-22 PROCEDURE — 77030028837 HC SYR ANGI PWR INJ COEU -A: Performed by: INTERNAL MEDICINE

## 2019-05-22 DEVICE — VIABAHN BX BALLOON EXP ENDO 8MMX29MM 7FR 135CMCATH HEPARIN
Type: IMPLANTABLE DEVICE | Status: FUNCTIONAL
Brand: GORE VIABAHN VBX BALLOON EXPANDABLE ENDO

## 2019-05-22 DEVICE — VIABAHN BX BALLOON EXP ENDO 8MMX59MM 7FR 80CMCATH HEPARIN
Type: IMPLANTABLE DEVICE | Status: FUNCTIONAL
Brand: GORE VIABAHN VBX BALLOON EXPANDABLE ENDO

## 2019-05-22 RX ORDER — HEPARIN SODIUM 200 [USP'U]/100ML
INJECTION, SOLUTION INTRAVENOUS
Status: COMPLETED | OUTPATIENT
Start: 2019-05-22 | End: 2019-05-22

## 2019-05-22 RX ORDER — FENTANYL CITRATE 50 UG/ML
INJECTION, SOLUTION INTRAMUSCULAR; INTRAVENOUS AS NEEDED
Status: DISCONTINUED | OUTPATIENT
Start: 2019-05-22 | End: 2019-05-22 | Stop reason: HOSPADM

## 2019-05-22 RX ORDER — GUAIFENESIN 100 MG/5ML
81 LIQUID (ML) ORAL DAILY
Qty: 30 TAB | Refills: 11 | Status: SHIPPED | OUTPATIENT
Start: 2019-05-23

## 2019-05-22 RX ORDER — LORAZEPAM 0.5 MG/1
0.5 TABLET ORAL
Status: DISCONTINUED | OUTPATIENT
Start: 2019-05-22 | End: 2019-05-23 | Stop reason: HOSPADM

## 2019-05-22 RX ORDER — NITROGLYCERIN 0.4 MG/1
0.4 TABLET SUBLINGUAL
Status: DISCONTINUED | OUTPATIENT
Start: 2019-05-22 | End: 2019-05-23 | Stop reason: HOSPADM

## 2019-05-22 RX ORDER — NALOXONE HYDROCHLORIDE 0.4 MG/ML
0.4 INJECTION, SOLUTION INTRAMUSCULAR; INTRAVENOUS; SUBCUTANEOUS AS NEEDED
Status: DISCONTINUED | OUTPATIENT
Start: 2019-05-22 | End: 2019-05-23 | Stop reason: HOSPADM

## 2019-05-22 RX ORDER — ATORVASTATIN CALCIUM 40 MG/1
40 TABLET, FILM COATED ORAL DAILY
Status: DISCONTINUED | OUTPATIENT
Start: 2019-05-22 | End: 2019-05-23 | Stop reason: HOSPADM

## 2019-05-22 RX ORDER — ONDANSETRON 2 MG/ML
4 INJECTION INTRAMUSCULAR; INTRAVENOUS
Status: DISCONTINUED | OUTPATIENT
Start: 2019-05-22 | End: 2019-05-23 | Stop reason: HOSPADM

## 2019-05-22 RX ORDER — SODIUM CHLORIDE 0.9 % (FLUSH) 0.9 %
5-40 SYRINGE (ML) INJECTION AS NEEDED
Status: DISCONTINUED | OUTPATIENT
Start: 2019-05-22 | End: 2019-05-23 | Stop reason: HOSPADM

## 2019-05-22 RX ORDER — HYDRALAZINE HYDROCHLORIDE 20 MG/ML
INJECTION INTRAMUSCULAR; INTRAVENOUS AS NEEDED
Status: DISCONTINUED | OUTPATIENT
Start: 2019-05-22 | End: 2019-05-22 | Stop reason: HOSPADM

## 2019-05-22 RX ORDER — CLOPIDOGREL BISULFATE 75 MG/1
75 TABLET ORAL DAILY
Status: DISCONTINUED | OUTPATIENT
Start: 2019-05-23 | End: 2019-05-23 | Stop reason: HOSPADM

## 2019-05-22 RX ORDER — SODIUM CHLORIDE 9 MG/ML
100 INJECTION, SOLUTION INTRAVENOUS CONTINUOUS
Status: DISPENSED | OUTPATIENT
Start: 2019-05-22 | End: 2019-05-23

## 2019-05-22 RX ORDER — HEPARIN SODIUM 1000 [USP'U]/ML
INJECTION, SOLUTION INTRAVENOUS; SUBCUTANEOUS AS NEEDED
Status: DISCONTINUED | OUTPATIENT
Start: 2019-05-22 | End: 2019-05-22 | Stop reason: HOSPADM

## 2019-05-22 RX ORDER — GUAIFENESIN 100 MG/5ML
81 LIQUID (ML) ORAL DAILY
Status: DISCONTINUED | OUTPATIENT
Start: 2019-05-23 | End: 2019-05-23 | Stop reason: HOSPADM

## 2019-05-22 RX ORDER — NYSTATIN 100000 [USP'U]/G
POWDER TOPICAL 2 TIMES DAILY
Status: DISCONTINUED | OUTPATIENT
Start: 2019-05-22 | End: 2019-05-23 | Stop reason: HOSPADM

## 2019-05-22 RX ORDER — CLOPIDOGREL BISULFATE 75 MG/1
TABLET ORAL AS NEEDED
Status: DISCONTINUED | OUTPATIENT
Start: 2019-05-22 | End: 2019-05-22 | Stop reason: HOSPADM

## 2019-05-22 RX ORDER — LIDOCAINE HYDROCHLORIDE 10 MG/ML
INJECTION, SOLUTION EPIDURAL; INFILTRATION; INTRACAUDAL; PERINEURAL AS NEEDED
Status: DISCONTINUED | OUTPATIENT
Start: 2019-05-22 | End: 2019-05-22 | Stop reason: HOSPADM

## 2019-05-22 RX ORDER — ACETAMINOPHEN 325 MG/1
650 TABLET ORAL
Status: DISCONTINUED | OUTPATIENT
Start: 2019-05-22 | End: 2019-05-23 | Stop reason: HOSPADM

## 2019-05-22 RX ORDER — SODIUM CHLORIDE 0.9 % (FLUSH) 0.9 %
5-40 SYRINGE (ML) INJECTION EVERY 8 HOURS
Status: DISCONTINUED | OUTPATIENT
Start: 2019-05-22 | End: 2019-05-23 | Stop reason: HOSPADM

## 2019-05-22 RX ORDER — IODIXANOL 320 MG/ML
INJECTION, SOLUTION INTRAVASCULAR AS NEEDED
Status: DISCONTINUED | OUTPATIENT
Start: 2019-05-22 | End: 2019-05-22 | Stop reason: HOSPADM

## 2019-05-22 RX ORDER — CLOPIDOGREL BISULFATE 75 MG/1
75 TABLET ORAL DAILY
Qty: 90 TAB | Refills: 3 | Status: SHIPPED | OUTPATIENT
Start: 2019-05-23

## 2019-05-22 RX ORDER — LISINOPRIL 20 MG/1
20 TABLET ORAL DAILY
Status: DISCONTINUED | OUTPATIENT
Start: 2019-05-23 | End: 2019-05-23 | Stop reason: HOSPADM

## 2019-05-22 RX ORDER — METOPROLOL SUCCINATE 50 MG/1
100 TABLET, EXTENDED RELEASE ORAL DAILY
Status: DISCONTINUED | OUTPATIENT
Start: 2019-05-23 | End: 2019-05-23 | Stop reason: HOSPADM

## 2019-05-22 RX ORDER — MIDAZOLAM HYDROCHLORIDE 1 MG/ML
INJECTION, SOLUTION INTRAMUSCULAR; INTRAVENOUS AS NEEDED
Status: DISCONTINUED | OUTPATIENT
Start: 2019-05-22 | End: 2019-05-22 | Stop reason: HOSPADM

## 2019-05-22 RX ORDER — AMLODIPINE BESYLATE 5 MG/1
5 TABLET ORAL DAILY
Status: DISCONTINUED | OUTPATIENT
Start: 2019-05-22 | End: 2019-05-23 | Stop reason: HOSPADM

## 2019-05-22 RX ADMIN — SODIUM CHLORIDE 100 ML/HR: 900 INJECTION, SOLUTION INTRAVENOUS at 12:00

## 2019-05-22 RX ADMIN — NYSTATIN: 100000 POWDER TOPICAL at 17:59

## 2019-05-22 RX ADMIN — NYSTATIN: 100000 POWDER TOPICAL at 12:44

## 2019-05-22 RX ADMIN — ACETAMINOPHEN 650 MG: 325 TABLET ORAL at 16:24

## 2019-05-22 NOTE — Clinical Note
Peripheral Lesion 2. balloon-expanding stent deployed and removed. Lesion 2 Fazal = 12 psi, Duration = 105 sec.

## 2019-05-22 NOTE — DISCHARGE INSTRUCTIONS
[address] [facility], [address]   [phone]  [address]    [email or web address]    Patient Discharge Instructions    Siddhartha Zavaleta / [id number] : 1955    Admitted 2019 Discharged: 2019       · It is important that you take the medication exactly as they are prescribed. · Keep your medication in the bottles provided by the pharmacist and keep a list of the medication names, dosages, and times to be taken in your wallet. · Do not take other medications without consulting your doctor. BRING ALL OF YOUR MEDICINES TO YOUR OFFICE VISIT. Follow-up with Gurpreet Antonio MD in 2 weeks. Cardiac Catheterization  Discharge Instructions     Do not drive, operate any machinery, or sign any legal documents for 24 hours after your procedure. You must have someone to drive you home.  You may take a shower 24 hours after your cardiac catheterization. Be sure to get the dressing wet and then remove it; gently wash the area with warm soapy water. Pat dry and leave open to air. To help prevent infections, be sure to keep the cath site clean and dry. No lotions, creams, powders, ointments, etc. in the cath site for approximately 1 week.  Do not take a tub bath, get in a hot tub or swimming pool for approximately 5 days or until the cath site is completely healed.  No strenuous activity or heavy lifting over 10 lbs. for 7 days.  Drink plenty of fluids for 24-48 hours after your cath to flush the contrast dye from your kidneys. No alcoholic beverages for 24 hours. You may resume your previous diet (low fat, low cholesterol) after your cath.  After your cath, some bruising or discomfort is common during the healing process. Tylenol, 1-2 tablets every 6 hours as needed, is recommended if you experience any discomfort.   If you experience any signs or symptoms of infection such as fever, chills, or poorly healing incision, persistent tenderness or swelling in the groin, redness and/or warmth to the touch, numbness, significant tingling or pain at the groin site or affected extremity, rash, drainage from the cath site, or if the leg feels tight or swollen, call your physician right away.  If bleeding at the cath site occurs, take a clean gauze pad and apply direct pressure to the groin just above the puncture site. Call 911 immediately, and continue to apply direct pressure until an ambulance gets to your location.  You may return to work  2  days after your cardiac cath if no groin bleeding. Information obtained by :  I understand that if any problems occur once I am at home I am to contact my physician. I understand and acknowledge receipt of the instructions indicated above. R.N.'s Signature                                                                  Date/Time                                                                                                                                              Patient or Representative Signature                                                          Date/Time      Hector Corcoran MD             [address] [facility], [address]    [phone]  [address]    [email or web address]

## 2019-05-22 NOTE — ROUTINE PROCESS
1022: Procedure sites CDI. No complaints 1620: Pt ambulated in hallway with RN, tolerated well, site CDI.

## 2019-05-22 NOTE — Clinical Note
Vessel: bilateral AA w/ Runoff, iliac, AA w/ Runoff, iliac, Power injection to the artery. Single view taken. PSI = 900. Injection rate = 10 mL/sec. Total injected volume = 30 mL.

## 2019-05-22 NOTE — Clinical Note
TRANSFER - OUT REPORT:  
 
Verbal report given to: Kaylah Rome RN. Report consisted of patient's Situation, Background, Assessment and  
Recommendations(SBAR). Opportunity for questions and clarification was provided. Patient transported with a Registered Nurse and [address] [place] [address] / [facility]. Patient transported to: [facility].

## 2019-05-22 NOTE — Clinical Note
Peripheral Lesion 1. balloon-expanding stent deployed and removed. Lesion 1 Fazal = 12 psi, Duration = 105 sec.

## 2019-05-22 NOTE — Clinical Note
Vessel: bilateral AA w/ Runoff, iliac and CFA, AA w/ Runoff, iliac and CFA, Hand injection to the artery. Single view taken.

## 2019-05-23 VITALS
TEMPERATURE: 97.8 F | OXYGEN SATURATION: 96 % | HEIGHT: 71 IN | SYSTOLIC BLOOD PRESSURE: 155 MMHG | RESPIRATION RATE: 16 BRPM | WEIGHT: 274 LBS | HEART RATE: 79 BPM | BODY MASS INDEX: 38.36 KG/M2 | DIASTOLIC BLOOD PRESSURE: 73 MMHG

## 2019-05-23 LAB
ANION GAP SERPL CALC-SCNC: 8 MMOL/L (ref 5–15)
BUN SERPL-MCNC: 15 MG/DL (ref 6–20)
BUN/CREAT SERPL: 18 (ref 12–20)
CALCIUM SERPL-MCNC: 8.2 MG/DL (ref 8.5–10.1)
CHLORIDE SERPL-SCNC: 106 MMOL/L (ref 97–108)
CO2 SERPL-SCNC: 24 MMOL/L (ref 21–32)
CREAT SERPL-MCNC: 0.85 MG/DL (ref 0.7–1.3)
GLUCOSE SERPL-MCNC: 87 MG/DL (ref 65–100)
POTASSIUM SERPL-SCNC: 3.8 MMOL/L (ref 3.5–5.1)
SODIUM SERPL-SCNC: 138 MMOL/L (ref 136–145)

## 2019-05-23 PROCEDURE — 74011250637 HC RX REV CODE- 250/637: Performed by: INTERNAL MEDICINE

## 2019-05-23 PROCEDURE — 80048 BASIC METABOLIC PNL TOTAL CA: CPT

## 2019-05-23 PROCEDURE — 36415 COLL VENOUS BLD VENIPUNCTURE: CPT

## 2019-05-23 RX ADMIN — ACETAMINOPHEN 650 MG: 325 TABLET ORAL at 07:29

## 2019-05-23 RX ADMIN — ASPIRIN 81 MG 81 MG: 81 TABLET ORAL at 08:06

## 2019-05-23 RX ADMIN — CLOPIDOGREL BISULFATE 75 MG: 75 TABLET ORAL at 08:06

## 2019-05-23 RX ADMIN — NYSTATIN: 100000 POWDER TOPICAL at 08:06

## 2019-05-23 RX ADMIN — Medication 10 ML: at 00:07

## 2019-05-23 RX ADMIN — Medication 10 ML: at 04:31

## 2019-05-23 NOTE — PROGRESS NOTES
1900 - Bedside report from [facility]. NSR. Mod bp. Bilat groins benign. No complaints. Up ad abhilash, in chair currently. 0700 - Bedside report to [facility].   NSR.

## 2019-05-23 NOTE — ROUTINE PROCESS
0700: bedside report received from kat blanca. 
 
0810: Pt refusing most of AM meds bc \"I am going home soon so I will take my own medicine. \" 
 
[phone]: discharge info reviewed with pt and wife, who both verbalized understanding. All questions answered, sites CDI. 1033: Pt discharged safely via wheelchair

## 2019-05-23 NOTE — PROGRESS NOTES
Progress Note      5/23/2019 9:59 AM  NAME: Joanne Lacey   MRN:  [id number]   Admit Diagnosis: PVD (peripheral vascular disease) ([facility].) [I73.9]     Problem List:     - s/p bilateral iliac stenting        Assessment/Plan:     Both groins healed  Good distal pulses  Labs ok  Feels better already    Home today. [x]       High complexity decision making was performed in this patient at high risk for decompensation with multiple organ involvement. Subjective:     Joanne Lacey denies chest pain, dyspnea. Discussed with RN events overnight. Review of Systems:    Symptom Y/N Comments  Symptom Y/N Comments   Fever/Chills N   Chest Pain N    Poor Appetite N   Edema N    Cough N   Abdominal Pain N    Sputum N   Joint Pain N    SOB/HERNANDEZ N   Pruritis/Rash N    Nausea/vomit N   Tolerating PT/OT Y    Diarrhea N   Tolerating Diet Y    Constipation N   Other       Could NOT obtain due to:      Objective:      Physical Exam:    Last 24hrs VS reviewed since prior progress note. Most recent are:    Visit Vitals  /73   Pulse 79   Temp 97.8 °F (36.6 °C)   Resp 16   Ht 5' 11\" (1.803 m)   Wt 124.3 kg (274 lb)   SpO2 96%   BMI 38.22 kg/m²       Intake/Output Summary (Last 24 hours) at 5/23/2019 0959  Last data filed at 5/23/2019 0441  Gross per 24 hour   Intake 1303.33 ml   Output 1150 ml   Net 153.33 ml        General Appearance: Well developed, well nourished, alert & oriented x 3,    no acute distress. Ears/Nose/Mouth/Throat: Hearing grossly normal.  Neck: Supple. Chest: Lungs clear to auscultation bilaterally. Cardiovascular: Regular rate and rhythm, S1S2 normal, no murmur. Abdomen: Soft, non-tender, bowel sounds are active. Extremities: No edema bilaterally. Skin: Warm and dry.     PMH/SH reviewed - no change compared to H&P    Data Review    Telemetry: normal sinus rhythm     Lab Data Personally Reviewed:    No results for input(s): WBC, HGB, HCT, PLT, HGBEXT, HCTEXT, PLTEXT in the last 72 hours. No results for input(s): INR, PTP, APTT in the last 72 hours. No lab exists for component: INREXT   Recent Labs     05/23/19  0429      K 3.8      CO2 24   BUN 15   CREA 0.85   GLU 87   CA 8.2*     No results for input(s): CPK, CKNDX, TROIQ in the last 72 hours. No lab exists for component: CPKMB  No results found for: CHOL, CHOLX, CHLST, CHOLV, HDL, LDL, LDLC, DLDLP, TGLX, TRIGL, TRIGP, CHHD, CHHDX    No results for input(s): SGOT, GPT, AP, TBIL, TP, ALB, GLOB, GGT, AML, LPSE in the last 72 hours. No lab exists for component: AMYP, HLPSE  No results for input(s): PH, PCO2, PO2 in the last 72 hours.     Medications Personally Reviewed:    Current Facility-Administered Medications   Medication Dose Route Frequency    amLODIPine (NORVASC) tablet 5 mg  5 mg Oral DAILY    atorvastatin (LIPITOR) tablet 40 mg  40 mg Oral DAILY    lisinopril (PRINIVIL, ZESTRIL) tablet 20 mg  20 mg Oral DAILY    metoprolol succinate (TOPROL-XL) XL tablet 100 mg  100 mg Oral DAILY    aspirin chewable tablet 81 mg  81 mg Oral DAILY    clopidogrel (PLAVIX) tablet 75 mg  75 mg Oral DAILY    sodium chloride (NS) flush 5-40 mL  5-40 mL IntraVENous Q8H    sodium chloride (NS) flush 5-40 mL  5-40 mL IntraVENous PRN    acetaminophen (TYLENOL) tablet 650 mg  650 mg Oral Q4H PRN    naloxone (NARCAN) injection 0.4 mg  0.4 mg IntraVENous PRN    ondansetron (ZOFRAN) injection 4 mg  4 mg IntraVENous Q4H PRN    nitroglycerin (NITROSTAT) tablet 0.4 mg  0.4 mg SubLINGual Q5MIN PRN    LORazepam (ATIVAN) tablet 0.5 mg  0.5 mg Oral Q6H PRN    nystatin (MYCOSTATIN) 100,000 unit/gram powder   Topical BID         Behzad Warner MD

## 2023-02-15 ENCOUNTER — OFFICE VISIT (OUTPATIENT)
Dept: NEUROLOGY | Age: 68
End: 2023-02-15

## 2023-02-15 DIAGNOSIS — G60.8 IDIOPATHIC SMALL FIBER SENSORY NEUROPATHY: Primary | ICD-10-CM

## 2023-04-19 ENCOUNTER — TRANSCRIBE ORDER (OUTPATIENT)
Dept: REGISTRATION | Age: 68
End: 2023-04-19

## 2023-04-19 ENCOUNTER — HOSPITAL ENCOUNTER (OUTPATIENT)
Dept: GENERAL RADIOLOGY | Age: 68
Discharge: HOME | End: 2023-04-19
Payer: COMMERCIAL

## 2023-04-19 DIAGNOSIS — M25.559 PAIN IN JOINT, PELVIC REGION AND THIGH: Primary | ICD-10-CM

## 2023-04-19 DIAGNOSIS — Z87.2 PERSONAL HISTORY OF DISEASE OF SKIN AND SUBCUTANEOUS TISSUE: ICD-10-CM

## 2023-04-19 DIAGNOSIS — M25.559 PAIN IN JOINT, PELVIC REGION AND THIGH: ICD-10-CM

## 2023-04-19 PROCEDURE — 72200 X-RAY EXAM SI JOINTS: CPT

## 2023-04-19 PROCEDURE — 72100 X-RAY EXAM L-S SPINE 2/3 VWS: CPT

## 2023-04-19 PROCEDURE — 73521 X-RAY EXAM HIPS BI 2 VIEWS: CPT

## 2023-04-23 DIAGNOSIS — Z87.2 PERSONAL HISTORY OF DISEASE OF SKIN AND SUBCUTANEOUS TISSUE: Primary | ICD-10-CM

## 2023-04-24 DIAGNOSIS — Z87.2 PERSONAL HISTORY OF DISEASE OF SKIN AND SUBCUTANEOUS TISSUE: Primary | ICD-10-CM

## 2023-04-26 ENCOUNTER — TRANSCRIBE ORDER (OUTPATIENT)
Dept: SCHEDULING | Age: 68
End: 2023-04-26

## 2023-04-27 ENCOUNTER — TRANSCRIBE ORDER (OUTPATIENT)
Dept: SCHEDULING | Age: 68
End: 2023-04-27

## 2023-04-27 DIAGNOSIS — M25.511 RIGHT SHOULDER PAIN: ICD-10-CM

## 2023-04-27 DIAGNOSIS — G47.00 INSOMNIA WITH SLEEP APNEA: ICD-10-CM

## 2023-04-27 DIAGNOSIS — E66.9 OBESITY, UNSPECIFIED: ICD-10-CM

## 2023-04-27 DIAGNOSIS — M1A.00X0 CHRONIC GOUTY ARTHRITIS: ICD-10-CM

## 2023-04-27 DIAGNOSIS — M25.559 PAIN IN JOINT, PELVIC REGION AND THIGH: Primary | ICD-10-CM

## 2023-04-27 DIAGNOSIS — G47.30 INSOMNIA WITH SLEEP APNEA: ICD-10-CM

## 2023-04-27 DIAGNOSIS — Z87.2 PERSONAL HISTORY OF DISEASE OF SKIN AND SUBCUTANEOUS TISSUE: ICD-10-CM

## 2023-04-27 DIAGNOSIS — M48.061 SPINAL STENOSIS, LUMBAR REGION, WITHOUT NEUROGENIC CLAUDICATION: ICD-10-CM

## 2023-04-27 DIAGNOSIS — G62.9 PERIPHERAL NERVE DISORDER: ICD-10-CM

## 2023-05-10 ENCOUNTER — TRANSCRIBE ORDERS (OUTPATIENT)
Facility: HOSPITAL | Age: 68
End: 2023-05-10

## 2023-05-10 DIAGNOSIS — E66.9 OBESITY, UNSPECIFIED CLASSIFICATION, UNSPECIFIED OBESITY TYPE, UNSPECIFIED WHETHER SERIOUS COMORBIDITY PRESENT: ICD-10-CM

## 2023-05-10 DIAGNOSIS — G47.30 INSOMNIA WITH SLEEP APNEA: ICD-10-CM

## 2023-05-10 DIAGNOSIS — M1A.00X0 CHRONIC GOUTY ARTHRITIS: ICD-10-CM

## 2023-05-10 DIAGNOSIS — M25.511 RIGHT SHOULDER PAIN, UNSPECIFIED CHRONICITY: ICD-10-CM

## 2023-05-10 DIAGNOSIS — M48.061 SPINAL STENOSIS, LUMBAR REGION WITHOUT NEUROGENIC CLAUDICATION: ICD-10-CM

## 2023-05-10 DIAGNOSIS — M25.559 PAIN IN JOINT INVOLVING PELVIC REGION AND THIGH, UNSPECIFIED LATERALITY: Primary | ICD-10-CM

## 2023-05-10 DIAGNOSIS — G62.9 PERIPHERAL NERVE DISORDER: ICD-10-CM

## 2023-05-10 DIAGNOSIS — Z87.2 PERSONAL HISTORY OF DISEASE OF SKIN AND SUBCUTANEOUS TISSUE: ICD-10-CM

## 2023-05-10 DIAGNOSIS — G47.00 INSOMNIA WITH SLEEP APNEA: ICD-10-CM

## 2024-03-13 LAB
HCV IGG SERPL QL IA: REACTIVE S/CO RATIO
HCV RNA SERPL NAA+PROBE-ACNC: NORMAL IU/ML
INTERPRETATION: NORMAL
REF LAB TEST REF RANGE: NORMAL

## 2024-03-19 LAB
M TB IFN-G BLD-IMP: NEGATIVE
M TB IFN-G CD4+ T-CELLS BLD-ACNC: 0.53 IU/ML
M TBIFN-G CD4+ CD8+T-CELLS BLD-ACNC: 0.61 IU/ML
QUANTIFERON CRITERIA: NORMAL
QUANTIFERON MITOGEN VALUE: >10 IU/ML
QUANTIFERON NIL VALUE: 0.47 IU/ML

## (undated) DEVICE — GAUZE SPONGES,12 PLY: Brand: CURITY

## (undated) DEVICE — PINNACLE R/O II INTRODUCER SHEATH WITH RADIOPAQUE MARKER: Brand: PINNACLE

## (undated) DEVICE — BLADE ASSEMB CLP HAIR FINE --

## (undated) DEVICE — SOLUTION SCRB 4OZ 10% PVP I POVIDONE IOD TOP PAINT EXIDINE

## (undated) DEVICE — INFECTION CONTROL KIT SYS

## (undated) DEVICE — NEEDLE HYPO 18GA L1.5IN PNK S STL HUB POLYPR SHLD REG BVL

## (undated) DEVICE — PINNACLE INTRODUCER SHEATH: Brand: PINNACLE

## (undated) DEVICE — PACK,EENT,TURBAN DRAPE,PK II: Brand: MEDLINE

## (undated) DEVICE — AIRLIFE™  ADULT CUSHION NASAL CANNULA WITH 7 FOOT (2.1 M) CRUSH-RESISTANT OXYGEN TUBING, AND U/CONNECT-IT ADAPTER: Brand: AIRLIFE™

## (undated) DEVICE — STERILE POLYISOPRENE POWDER-FREE SURGICAL GLOVES: Brand: PROTEXIS

## (undated) DEVICE — KENDALL SCD EXPRESS SLEEVES, KNEE LENGTH, MEDIUM: Brand: KENDALL SCD

## (undated) DEVICE — PACK PROCEDURE SURG HRT CATH

## (undated) DEVICE — SOLUTION IV 1000ML 0.9% SOD CHL

## (undated) DEVICE — REM POLYHESIVE ADULT PATIENT RETURN ELECTRODE: Brand: VALLEYLAB

## (undated) DEVICE — Device

## (undated) DEVICE — SYR IRR BLB 2OZ DISP BLU STRL -- CONVERT TO ITEM 357637

## (undated) DEVICE — SUTURE PROL SZ 4-0 L18IN NONABSORBABLE BLU L16MM PC-3 3/8 8634G

## (undated) DEVICE — SYR ART 700 CLEAR MARK 7 -- ARTERION

## (undated) DEVICE — DRAPE PRB US TRNSDCR 6X96IN --

## (undated) DEVICE — SUTURE MCRYL SZ 4-0 L27IN ABSRB UD RB-1 L17MM 1/2 CIR Y214H

## (undated) DEVICE — (D)SYR 10ML 1/5ML GRAD NSAF -- PKGING CHANGE USE ITEM 338027

## (undated) DEVICE — SYR POWER 150ML 8IN FILL TUBE --

## (undated) DEVICE — ANGIOGRAPHY KIT CUST [K0910930B] [MERIT MEDICAL SYSTEMS INC]

## (undated) DEVICE — CATHETER ANGIO 5FR L70CM GWIRE 0.035IN 1CM SPC OMNI FLSH 21

## (undated) DEVICE — FRAZIER SUCTION INSTRUMENT 7 FR W/CONTROL VENT & OBTURATOR: Brand: FRAZIER

## (undated) DEVICE — KIT ACCS INTRO 4FR L10CM NDL 21GA L7CM GWIRE L40CM

## (undated) DEVICE — TORCON NB, ADVANTAGE CATHETER: Brand: TORCON NB

## (undated) DEVICE — TOWEL SURG W17XL27IN STD BLU COT NONFENESTRATED PREWASHED

## (undated) DEVICE — HANDLE LT SNAP ON ULT DURABLE LENS FOR TRUMPF ALC DISPOSABLE

## (undated) DEVICE — CUSTOM KT PTCA INFL DEV K05 00053H

## (undated) DEVICE — DRESSING HEMOSTATIC SFT INTVENT W/O SLT DBL WRP QUIKCLOT LF

## (undated) DEVICE — DEVON™ KNEE AND BODY STRAP 60" X 3" (1.5 M X 7.6 CM): Brand: DEVON

## (undated) DEVICE — ANGIO-SEAL VIP VASCULAR CLOSURE DEVICE: Brand: ANGIO-SEAL

## (undated) DEVICE — ELECTRODE NDL TOT L2.13IN EXPOSED L3CM ACT L3MM TIP

## (undated) DEVICE — HI-TORQUE VERSACORE MODIFIED J GUIDE WIRE SYSTEM 260 CM: Brand: HI-TORQUE VERSACORE